# Patient Record
Sex: FEMALE | Race: OTHER | HISPANIC OR LATINO | URBAN - METROPOLITAN AREA
[De-identification: names, ages, dates, MRNs, and addresses within clinical notes are randomized per-mention and may not be internally consistent; named-entity substitution may affect disease eponyms.]

---

## 2020-07-21 ENCOUNTER — EMERGENCY (EMERGENCY)
Facility: HOSPITAL | Age: 35
LOS: 1 days | Discharge: HOME | End: 2020-07-21
Attending: STUDENT IN AN ORGANIZED HEALTH CARE EDUCATION/TRAINING PROGRAM | Admitting: STUDENT IN AN ORGANIZED HEALTH CARE EDUCATION/TRAINING PROGRAM
Payer: MEDICAID

## 2020-07-21 VITALS
RESPIRATION RATE: 18 BRPM | OXYGEN SATURATION: 99 % | HEART RATE: 67 BPM | DIASTOLIC BLOOD PRESSURE: 58 MMHG | SYSTOLIC BLOOD PRESSURE: 98 MMHG | TEMPERATURE: 99 F

## 2020-07-21 VITALS
TEMPERATURE: 98 F | HEART RATE: 76 BPM | OXYGEN SATURATION: 100 % | RESPIRATION RATE: 16 BRPM | SYSTOLIC BLOOD PRESSURE: 98 MMHG | DIASTOLIC BLOOD PRESSURE: 51 MMHG

## 2020-07-21 PROCEDURE — 99284 EMERGENCY DEPT VISIT MOD MDM: CPT

## 2020-07-21 PROCEDURE — 70450 CT HEAD/BRAIN W/O DYE: CPT | Mod: 26

## 2020-07-21 RX ORDER — LIDOCAINE 4 G/100G
1 CREAM TOPICAL ONCE
Refills: 0 | Status: COMPLETED | OUTPATIENT
Start: 2020-07-21 | End: 2020-07-21

## 2020-07-21 RX ORDER — ACETAMINOPHEN 500 MG
975 TABLET ORAL ONCE
Refills: 0 | Status: COMPLETED | OUTPATIENT
Start: 2020-07-21 | End: 2020-07-21

## 2020-07-21 RX ORDER — LORATADINE 10 MG/1
10 TABLET ORAL ONCE
Refills: 0 | Status: COMPLETED | OUTPATIENT
Start: 2020-07-21 | End: 2020-07-21

## 2020-07-21 RX ORDER — PSEUDOEPHEDRINE HCL 30 MG
30 TABLET ORAL ONCE
Refills: 0 | Status: COMPLETED | OUTPATIENT
Start: 2020-07-21 | End: 2020-07-21

## 2020-07-21 RX ADMIN — LORATADINE 10 MILLIGRAM(S): 10 TABLET ORAL at 12:44

## 2020-07-21 RX ADMIN — Medication 975 MILLIGRAM(S): at 12:43

## 2020-07-21 RX ADMIN — LIDOCAINE 1 PATCH: 4 CREAM TOPICAL at 12:44

## 2020-07-21 RX ADMIN — Medication 30 MILLIGRAM(S): at 12:54

## 2020-07-21 NOTE — ED PROVIDER NOTE - PHYSICAL EXAMINATION
GENERAL: young female, lying in bed, NAD. Vital signs are within normal limits  HEENT: NC/AT, conjunctiva noninjected and sclera anicteric, moist mucous membranes, cerumen impacting right ear, no oropharyngeal edema or erythema, no tonsillar swelling or exudate, maxillary sinus tenderness  NECK: full range of motion, mild neck hypertonicity. trachea midline, no LAD  LUNG: CTAB, no w/r/r appreciated  CV: RRR, no m/r/g appreciated, Pulses- Radial: 2+ b/l  ABDOMEN: Soft, NTND, no rebound or guarding  BACK: No midline spinal tenderness or step-offs. No paraspinal tenderness to palpation  MSK: No edema, no visible deformities, full range of motion UE/LE b/l, 5/5 muscle strength UE/LE b/l, nontender extremities  NEURO: AAOx4 (to person, place, time, event), sensation grossly intact, finger-to-nose smooth and rapid, no pronator drift, steady gait  -Cranial Nerves:  --CN II: PERRL  --CN III, IV, VI: EOMI b/l  --CN V1-3: Facial sensation intact to touch  --CN VII: No facial asymmetry or droop  --CN VIII: Hearing intact to rubbing fingers b/l  --CN IX, X: Palate elevates symmetrically. Normal phonation  --CN XI: Heading turning and shoulder shrug intact b/l  --CN XII: Tongue midline with normal movements  SKIN: Warm, dry, well perfused, no evidence of rash  PSYCH: Normal mood and affect

## 2020-07-21 NOTE — ED PROVIDER NOTE - CLINICAL SUMMARY MEDICAL DECISION MAKING FREE TEXT BOX
34 y.o. female here for headache with mild neck pain, sore throat, right sided ear pain worse w/' jaw opening. Vitals wnl .Exam with right ear cerumen impaction. No loose dentition or intraoral abscess. No signs of objective signs of pharyngitis. Headache worse with leaning forward. Maxillary sinus tenderness.   -likely sinus headache. does not appear to be SAH, not meningismus. Will treat symptomatically, reassess.

## 2020-07-21 NOTE — ED PROVIDER NOTE - NS ED ROS FT
CONSTITUTIONAL: No fevers, chills, fatigue, dizziness, lightheadedness weakness  HEENT: EAR PAIN, SORE THROAT, JAW PAIN W/ JAW OPENING, ESPECIALLY ON RIGHT,. No loss of vision, double vision, blurry vision, nasal congestion, runny nose,  CV: No chest pain, palpitations  PULM: No cough, shortness of breath  GI: No abdominal pain, nausea, vomiting, diarrhea  SKIN: No rashes, swelling  MSK: NECK PAIN, UPPER BACK PAIN  NEURO: HEADACHE. no paresthesias

## 2020-07-21 NOTE — ED PROVIDER NOTE - PATIENT PORTAL LINK FT
You can access the FollowMyHealth Patient Portal offered by [facility] by registering at the following website: [email or web address]. By joining [facility]’s FollowMyHealth portal, you will also be able to view your health information using other applications (apps) compatible with our system.

## 2020-07-21 NOTE — ED PROVIDER NOTE - ATTENDING CONTRIBUTION TO CARE
Hermes DYE: I agree with the above provided history and exam and addend/modify it as follows.     34F w/ chronic neck/back/headache pain worse the past 10 days. No fever, n/v/d/c, chest / abd pain, cough, sob, dizziness, dysuria/hematuria. No recent travel, medication change, illness, or hospitalization.     Patient appears uncomfortable but exam overall nonfocal - neuro intact, no focal swelling/erythema/tenderness.    nontoxic 34F w/ acute on chronic headache, possible MSK vs sinusitis, low suspicion for ICH, abscess, other concerning pathology. will give pain meds reassess, try to help arrange f/u for outpatient pt and pcp f/u    I Jasson Mirza MD performed a history and physical exam of the patient and discussed their management with the resident and /or advanced care provider. I reviewed the resident and /or ACP's note and agree with the documented findings and plan of care. My medical decision making and observations are found above.

## 2020-07-21 NOTE — ED PROVIDER NOTE - OBJECTIVE STATEMENT
34 y.o. female presenting to the ED for posterior and frontal achy headache 34 y.o. female hx of hypothyroidism presenting to the ED for posterior and frontal achy headache with mild neck stiffness and upper back discomfort x 10 days, worse with leaning head forward. Feels similar to prior headaches. Denies visual changes, nausea, vomiting, numbness, tingling, weakness. Patient also endorses right ear pain worse with jaw opening, sore throat. Patient on augmentin that she started 5 days ago along with 600mg ibuprofen with minimal relief. Denies chest pain, shortness of breath, leg pain or swelling, hx of clots.

## 2020-07-21 NOTE — ED PROVIDER NOTE - NSFOLLOWUPINSTRUCTIONS_ED_ALL_ED_FT
FUE VISTO EN EL ED PARA: dolor de richar    MCCLOUD EXPLORACIÓN CT DE LA RICHAR NO MOSTRÓ NINGÚN RESULTADO STEPHANIE.    PARA DOLOR / FIEBRE, PUEDE STACIE TYLENOL (acetaminofeno) Y / O IBUPROFENO (advil o motrin). SIGA LAS INSTRUCCIONES EN LA ETIQUETA / CONTENEDOR.    POR FAVOR SIGA CON MCCLOUD MÉDICO PRIVADO EN LAS PRÓXIMAS 72 HORAS. TRAIGA COPIAS DE DEIDRA RESULTADOS.  -Si no tiene un PMD, llame al [phone] para encontrar dane conveniente para usted o llame a nuestra clínica al [phone].    REGRESE AL DEPARTAMENTO DE EMERGENCIA SI EXPERIMENTA ALGUNOS SÍNTOMAS NUEVOS / RELACIONADOS / DESTACADOS.  ___________________________________________________________________________________________________________________________    YOU WERE SEEN IN THE ED FOR: headache    YOUR CT SCAN OF THE HEAD DID NOT SHOW ANY ACUTE FINDINGS.    FOR PAIN/FEVER, YOU MAY TAKE TYLENOL (acetaminophen) AND/OR IBUPROFEN (advil or motrin). FOLLOW THE INSTRUCTIONS ON THE LABEL/CONTAINER.    PLEASE FOLLOW UP WITH YOUR PRIVATE PHYSICIAN WITHIN THE NEXT 72 HOURS. BRING COPIES OF YOUR RESULTS.  -If you do not have a PMD, please call [phone] to find one convenient for you or call our clinic at [phone].    RETURN TO THE EMERGENCY DEPARTMENT IF YOU EXPERIENCE ANY NEW/CONCERNING/WORSENING SYMPTOMS.

## 2020-07-21 NOTE — ED PROVIDER NOTE - PROGRESS NOTE DETAILS
Lei Kraft D.O., PGY2 (Resident)  Spoke to discharge lounge to see if they can assist patient find PT followp for chronic neck and back pain Lei Kraft D.O., PGY2 (Resident)  Spoke to ZURDO jalloh and ROSEMARY. Unable to assist further given patient has emergency medicaid.    #: [id number]  Patient states headaches have been chronic. Improved with meds given in ED. Tylenol helps most. Patient endorses using a injection " nervio?" that has vitamins among other stuff that used to help. Last injected self yesterday. Was getting it in [facility]. Med no longer helps. Given poor followup and difficulty getting patient followup, will get CTH and given neuro f/u. Lei Kraft D.O., PGY2 (Resident)  Patient feels improved. Agreeable to out patient followup. Results endorsed. Return precautions given. Patient expressed verbal understanding.  Will DC w/ PCP f/u.

## 2020-07-21 NOTE — ED PROVIDER NOTE - NSFOLLOWUPCLINICS_GEN_ALL_ED_FT
[facility]  Neurology  [address] [place] [address] [address] [place] [address]  Phone: [phone]  Fax:   Follow Up Time: 1-3 Days